# Patient Record
Sex: MALE | Race: WHITE | NOT HISPANIC OR LATINO | Employment: OTHER | URBAN - METROPOLITAN AREA
[De-identification: names, ages, dates, MRNs, and addresses within clinical notes are randomized per-mention and may not be internally consistent; named-entity substitution may affect disease eponyms.]

---

## 2018-07-24 ENCOUNTER — TELEPHONE (OUTPATIENT)
Dept: ORTHOPEDIC SURGERY | Facility: CLINIC | Age: 38
End: 2018-07-24

## 2018-07-24 NOTE — TELEPHONE ENCOUNTER
NEW PAT IS REQUEST AN APPT WITH [facility] TOMORROW IF POSSIBLE. [facility] AND [facility] ARE ASKING THAT HE HAVE HIS INJURY TREATED AS SOON AS POSSIBLE. pAT INJURED HIS LEFT HAND 07/19 WHILE PLAYING BASKETBALL. FX 5TH METACARPAL. NXR TO BRING. IS CURRENTLY IN SOFT CAST BUT IN ORDER FOR MANNIE TO BE RELEASED HE MUST BE IN HARD CAST. PAT ASKING IF [facility] CAN SEE?    IF SO CAN HE POSSIBLY SEE TOMORROW?

## 2018-07-25 NOTE — TELEPHONE ENCOUNTER
"Informed pat unable to sched on Friday but we can see him Monday, however, patient stated he is going to keep calling around as he is having trouble with approval from \" chain of command\"  "

## 2025-04-04 ENCOUNTER — HOSPITAL ENCOUNTER (EMERGENCY)
Facility: HOSPITAL | Age: 45
Discharge: HOME | End: 2025-04-04
Attending: EMERGENCY MEDICINE
Payer: OTHER GOVERNMENT

## 2025-04-04 VITALS
SYSTOLIC BLOOD PRESSURE: 121 MMHG | TEMPERATURE: 98.7 F | DIASTOLIC BLOOD PRESSURE: 90 MMHG | HEART RATE: 77 BPM | RESPIRATION RATE: 15 BRPM | OXYGEN SATURATION: 98 %

## 2025-04-04 DIAGNOSIS — T80.69XA ALLERGIC REACTION TO VACCINE: Primary | ICD-10-CM

## 2025-04-04 DIAGNOSIS — T78.2XXA ANAPHYLAXIS, INITIAL ENCOUNTER: ICD-10-CM

## 2025-04-04 PROCEDURE — 99283 EMERGENCY DEPT VISIT LOW MDM: CPT

## 2025-04-04 NOTE — DISCHARGE INSTRUCTIONS
It sounds like you had a bad allergic reaction (anaphylaxis) after getting your flu vaccine.    We will add this to your allergy list.    You may need to purchase some over-the-counter Benadryl (diphenhydramine) for any hives or itching or swelling.    Please return to the ER if anything worsens today.

## 2025-04-04 NOTE — Clinical Note
[facility] EMERGENCY ROOM  [address] [place] [address]  Phone: [phone]  Fax: [phone]    Thomas Perkins was seen and treated in our emergency department on 4/4/2025.  He may return to work on 04/05/2025.         Thank you for choosing [facility].    Yogi Chand MD       Private car

## 2025-04-04 NOTE — ED PROVIDER NOTES
Time: 1:03 PM EDT  Date of encounter:  4/4/2025  Independent Historian/Clinical History and Information was obtained by:   Patient and Nursing Staff    History is limited by: N/A    Chief Complaint: Allergic reaction to vaccine      History of Present Illness:  Patient is a 44 y.o. year old male with previous history of allergies or side effects from flu vaccines over the past years who presents to the emergency department for evaluation of acute allergic reaction as soon as he got the flu vaccine administered.    He started having redness and flushing of his face and ears, felt somewhat itchy and unwell, developed tightness in the throat or difficulty breathing.    He was given an EpiPen injection and given Benadryl and Solu-Medrol prior to arrival and is feeling much better.    He initially felt a bit jittery after the epinephrine but now feels better.          Patient Care Team  Primary Care Provider: Provider, No Known    Past Medical History:     Allergies   Allergen Reactions    Haemophilus Influenzae Vaccines Anaphylaxis    Penicillins Anaphylaxis     Past Medical History:   Diagnosis Date    Allergic reaction     Pneumonia     Testicular torsion     Umbilical hernia      Past Surgical History:   Procedure Laterality Date    HERNIA REPAIR       History reviewed. No pertinent family history.    Home Medications:  Prior to Admission medications    Not on File        Social History:   Social History     Tobacco Use    Smoking status: Every Day     Current packs/day: 1.00     Types: Cigarettes    Smokeless tobacco: Never   Vaping Use    Vaping status: Never Used   Substance Use Topics    Alcohol use: Not Currently    Drug use: Never         Review of Systems:  Review of Systems   I performed a 10 point review of systems which was all negative, except for the positives found in the HPI above.      Physical Exam:  /76 (BP Location: Left arm, Patient Position: Sitting)   Pulse 81   Temp 98.7 °F (37.1 °C)  (Oral)   Resp 18   SpO2 96%     Physical Exam   General: Awake alert and in no obvious distress (has already been treated for allergic reaction)    HEENT: Head normocephalic atraumatic, eyes PERRLA EOMI, nose normal, oropharynx normal.    Neck: Supple full range of motion, no meningismus, no lymphadenopathy    Heart: Regular rate and rhythm, no murmurs or rubs, 2+ radial pulses bilaterally    Lungs: Clear to auscultation bilaterally without wheezes or crackles, no respiratory distress    Abdomen: Soft, nontender, nondistended, no rebound or guarding    Skin: Warm, dry, no rash    Musculoskeletal: Normal range of motion, no lower extremity edema    Neurologic: Oriented x3, no motor deficits no sensory deficits    Psychiatric: Mood appears stable, no psychosis            Medical Decision Making:      Comorbidities that affect care:    Allergy to vaccination    External Notes reviewed:    None      The following orders were placed and all results were independently analyzed by me:  No orders of the defined types were placed in this encounter.      Medications Given in the Emergency Department:  Medications - No data to display     ED Course:         Labs:    Lab Results (last 24 hours)       ** No results found for the last 24 hours. **             Imaging:    No Radiology Exams Resulted Within Past 24 Hours      Differential Diagnosis and Discussion:    Allergic Reaction: Differential diagnosis includes but is not limited to drug side effects, contact dermatitis, autoimmune conditions, infections, mast cell disorders, serum sickness, anaphylactoid reactions, angioedema, panic or anxiety attacks.    PROCEDURES:        No orders to display       Procedures    MDM           This patient is a healthy appearing 44-year-old male with previous history of allergy to flu vaccine who presents after [employer] wanted him to get flu vaccine this year and shortly after developed an allergic reaction with component of  anaphylaxis.    He was already treated with EpiPen, Benadryl Solu-Medrol prior to arrival.    Symptoms have abated and he looks clinically improved and well-appearing.    We observed him in the ED for a while, now a few hours after getting the EpiPen injection and he still looks asymptomatic here.    I do not see any signs of airway involvement currently and I will discharge him home with supportive care instructions, avoid flu vaccine in the future, we will add this to his allergy list.                Patient Care Considerations:          Consultants/Shared Management Plan:        Social Determinants of Health:    Patient is independent, reliable, and has access to care.       Disposition and Care Coordination:    Discharged: The patient is suitable and stable for discharge with no need for consideration of admission.    I have explained the patient´s condition, diagnoses and treatment plan based on the information available to me at this time. I have answered questions and addressed any concerns. The patient has a good  understanding of the patient´s diagnosis, condition, and treatment plan as can be expected at this point. The vital signs have been stable. The patient´s condition is stable and appropriate for discharge from the emergency department.      The patient will pursue further outpatient evaluation with the primary care physician or other designated or consulting physician as outlined in the discharge instructions. They are agreeable to this plan of care and follow-up instructions have been explained in detail. The patient has received these instructions in written format and has expressed an understanding of the discharge instructions. The patient is aware that any significant change in condition or worsening of symptoms should prompt an immediate return to this or the closest emergency department or call to 911.  I have explained discharge medications and the need for follow up with the  patient/caretakers. This was also printed in the discharge instructions. Patient was discharged with the following medications and follow up:      Medication List      No changes were made to your prescriptions during this visit.      Provider, No Known  [place] [address]    Call in 2 days  As needed, If symptoms worsen, for a follow-up appointment       Final diagnoses:   Allergic reaction to vaccine   Anaphylaxis, initial encounter        ED Disposition       ED Disposition   Discharge    Condition   Stable    Comment   --               This medical record created using voice recognition software.             Yogi Chand MD  04/04/25 [id number]